# Patient Record
Sex: FEMALE | Race: WHITE | NOT HISPANIC OR LATINO | URBAN - METROPOLITAN AREA
[De-identification: names, ages, dates, MRNs, and addresses within clinical notes are randomized per-mention and may not be internally consistent; named-entity substitution may affect disease eponyms.]

---

## 2023-06-27 LAB
POC CREATININE: 0.6 MG/DL (ref 0.6–1.3)
POCT GFR - DATA CONVERSION: >60

## 2024-07-31 DIAGNOSIS — I77.810 AORTIC ECTASIA, THORACIC (CMS-HCC): ICD-10-CM

## 2024-08-01 ENCOUNTER — OFFICE VISIT (OUTPATIENT)
Dept: CARDIOLOGY | Facility: HOSPITAL | Age: 69
End: 2024-08-01
Payer: MEDICARE

## 2024-08-01 ENCOUNTER — HOSPITAL ENCOUNTER (OUTPATIENT)
Dept: VASCULAR MEDICINE | Facility: HOSPITAL | Age: 69
Discharge: HOME | End: 2024-08-01
Payer: MEDICARE

## 2024-08-01 ENCOUNTER — HOSPITAL ENCOUNTER (OUTPATIENT)
Dept: RADIOLOGY | Facility: HOSPITAL | Age: 69
Discharge: HOME | End: 2024-08-01
Payer: MEDICARE

## 2024-08-01 VITALS
DIASTOLIC BLOOD PRESSURE: 66 MMHG | BODY MASS INDEX: 25.25 KG/M2 | OXYGEN SATURATION: 95 % | WEIGHT: 176.4 LBS | SYSTOLIC BLOOD PRESSURE: 131 MMHG | HEIGHT: 70 IN | HEART RATE: 66 BPM

## 2024-08-01 DIAGNOSIS — I77.810 AORTIC ECTASIA, THORACIC (CMS-HCC): ICD-10-CM

## 2024-08-01 DIAGNOSIS — I77.3 RENAL ARTERY STENOSIS DUE TO FIBROMUSCULAR DYSPLASIA (CMS-HCC): ICD-10-CM

## 2024-08-01 DIAGNOSIS — E78.2 MIXED HYPERLIPIDEMIA: ICD-10-CM

## 2024-08-01 DIAGNOSIS — I77.3 FIBROMUSCULAR DYSPLASIA (CMS-HCC): ICD-10-CM

## 2024-08-01 DIAGNOSIS — I70.1 RENAL ARTERY STENOSIS DUE TO FIBROMUSCULAR DYSPLASIA (CMS-HCC): ICD-10-CM

## 2024-08-01 DIAGNOSIS — I65.22 ATHEROSCLEROSIS OF LEFT CAROTID ARTERY: Primary | ICD-10-CM

## 2024-08-01 DIAGNOSIS — I77.73 RENAL ARTERY DISSECTION (MULTI): ICD-10-CM

## 2024-08-01 PROBLEM — N64.4 BREAST PAIN: Status: ACTIVE | Noted: 2020-10-28

## 2024-08-01 PROBLEM — Z85.3 HISTORY OF MALIGNANT NEOPLASM OF BREAST: Status: ACTIVE | Noted: 2024-08-01

## 2024-08-01 PROBLEM — E78.5 HYPERLIPIDEMIA: Status: ACTIVE | Noted: 2024-08-01

## 2024-08-01 PROBLEM — G47.10 HYPERSOMNIA: Status: ACTIVE | Noted: 2024-04-23

## 2024-08-01 PROBLEM — I65.29 CAROTID ATHEROSCLEROSIS: Status: ACTIVE | Noted: 2024-08-01

## 2024-08-01 PROBLEM — G47.33 OBSTRUCTIVE SLEEP APNEA SYNDROME: Status: ACTIVE | Noted: 2024-08-01

## 2024-08-01 PROBLEM — Z98.890 HISTORY OF OPERATIVE PROCEDURE ON HIP: Status: ACTIVE | Noted: 2024-08-01

## 2024-08-01 PROBLEM — N20.0 KIDNEY STONE: Status: ACTIVE | Noted: 2024-08-01

## 2024-08-01 PROBLEM — C80.1 CANCER (MULTI): Status: ACTIVE | Noted: 2024-08-01

## 2024-08-01 PROBLEM — R55 VASOVAGAL SYNCOPE: Status: ACTIVE | Noted: 2024-08-01

## 2024-08-01 PROBLEM — H26.9 BILATERAL CATARACTS: Status: ACTIVE | Noted: 2024-08-01

## 2024-08-01 PROBLEM — Z85.89 H/O SQUAMOUS CELL CARCINOMA: Status: ACTIVE | Noted: 2021-11-02

## 2024-08-01 PROCEDURE — 3008F BODY MASS INDEX DOCD: CPT | Performed by: INTERNAL MEDICINE

## 2024-08-01 PROCEDURE — 1036F TOBACCO NON-USER: CPT | Performed by: INTERNAL MEDICINE

## 2024-08-01 PROCEDURE — 93975 VASCULAR STUDY: CPT | Performed by: SURGERY

## 2024-08-01 PROCEDURE — 1126F AMNT PAIN NOTED NONE PRSNT: CPT | Performed by: INTERNAL MEDICINE

## 2024-08-01 PROCEDURE — 3078F DIAST BP <80 MM HG: CPT | Performed by: INTERNAL MEDICINE

## 2024-08-01 PROCEDURE — 93975 VASCULAR STUDY: CPT

## 2024-08-01 PROCEDURE — 3075F SYST BP GE 130 - 139MM HG: CPT | Performed by: INTERNAL MEDICINE

## 2024-08-01 PROCEDURE — 71555 MRI ANGIO CHEST W OR W/O DYE: CPT

## 2024-08-01 PROCEDURE — 99214 OFFICE O/P EST MOD 30 MIN: CPT | Performed by: INTERNAL MEDICINE

## 2024-08-01 PROCEDURE — 1159F MED LIST DOCD IN RCRD: CPT | Performed by: INTERNAL MEDICINE

## 2024-08-01 PROCEDURE — 99214 OFFICE O/P EST MOD 30 MIN: CPT | Mod: 25 | Performed by: INTERNAL MEDICINE

## 2024-08-01 PROCEDURE — 71555 MRI ANGIO CHEST W OR W/O DYE: CPT | Performed by: STUDENT IN AN ORGANIZED HEALTH CARE EDUCATION/TRAINING PROGRAM

## 2024-08-01 RX ORDER — SERTRALINE HYDROCHLORIDE 100 MG/1
1 TABLET, FILM COATED ORAL DAILY
COMMUNITY

## 2024-08-01 RX ORDER — MONTELUKAST SODIUM 10 MG/1
1 TABLET ORAL DAILY
COMMUNITY
Start: 2024-05-22

## 2024-08-01 RX ORDER — TRETINOIN 0.5 MG/G
CREAM TOPICAL
COMMUNITY
Start: 2023-11-08

## 2024-08-01 RX ORDER — CHOLECALCIFEROL (VITAMIN D3) 50 MCG
TABLET ORAL
COMMUNITY

## 2024-08-01 RX ORDER — ROSUVASTATIN CALCIUM 20 MG/1
1 TABLET, COATED ORAL DAILY
COMMUNITY
Start: 2023-06-28

## 2024-08-01 RX ORDER — PREGABALIN 300 MG/1
1 CAPSULE ORAL NIGHTLY
COMMUNITY

## 2024-08-01 RX ORDER — NAPROXEN SODIUM 220 MG/1
81 TABLET, FILM COATED ORAL
COMMUNITY

## 2024-08-01 RX ORDER — LOSARTAN POTASSIUM 25 MG/1
1 TABLET ORAL DAILY
COMMUNITY
Start: 2021-04-21

## 2024-08-01 RX ORDER — MULTIVITAMIN
1 TABLET ORAL DAILY
COMMUNITY

## 2024-08-01 RX ORDER — CLONAZEPAM 0.5 MG/1
0.5 TABLET, ORALLY DISINTEGRATING ORAL
COMMUNITY
Start: 2023-09-14

## 2024-08-01 ASSESSMENT — PATIENT HEALTH QUESTIONNAIRE - PHQ9
2. FEELING DOWN, DEPRESSED OR HOPELESS: NOT AT ALL
1. LITTLE INTEREST OR PLEASURE IN DOING THINGS: NOT AT ALL
SUM OF ALL RESPONSES TO PHQ9 QUESTIONS 1 AND 2: 0

## 2024-08-01 ASSESSMENT — COLUMBIA-SUICIDE SEVERITY RATING SCALE - C-SSRS
6. HAVE YOU EVER DONE ANYTHING, STARTED TO DO ANYTHING, OR PREPARED TO DO ANYTHING TO END YOUR LIFE?: NO
1. IN THE PAST MONTH, HAVE YOU WISHED YOU WERE DEAD OR WISHED YOU COULD GO TO SLEEP AND NOT WAKE UP?: NO
2. HAVE YOU ACTUALLY HAD ANY THOUGHTS OF KILLING YOURSELF?: NO

## 2024-08-01 ASSESSMENT — PAIN SCALES - GENERAL: PAINLEVEL: 0-NO PAIN

## 2024-08-01 NOTE — PROGRESS NOTES
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 Her lesions are not entirely classical for FMD. She also has significant arterial tortuosity. Her mother had an abdominal aortic aneurysm that was monitored and also had a myocardial infarction of uncertain mechanism as her coronary angiogram was \"normal\". Prior multigene aortopathy panel negative. She has no family hx of aortic dissection.    Since I saw Ms. Monroe last, she has not had major events.  NO ER visits or hosptial stays. No chest pain, dyspnea. No major flank/back pain. No bleeding. BP well controlled.     She is a breast cancer survivor and has bilateral implants. Had abdominal imaging given BRACA1 mutation.    She has mild atherosclerotic disease on imaging (dee. Left ICA); on statin Rx and doing fine with this.    She was dx with and is now receiving Rx for obstructive sleep apnea.    Patient Active Problem List   Diagnosis    Allergic rhinitis    Aortic ectasia, thoracic (CMS-HCC)    Renal infarction (Multi)    Benign neoplasm of rectum and anal canal    Bilateral cataracts    BRCA gene positive    Breast neoplasm    Breast cancer (Multi)    Breast pain    Cancer (Multi)    Carotid atherosclerosis    Closed fracture of right ankle with routine healing    H/O squamous cell carcinoma    Hereditary and idiopathic peripheral neuropathy    History of malignant neoplasm of breast    History of operative procedure on hip    Hyperlipidemia    Hypersomnia    Hypertension, benign    Kidney stone    Obstructive sleep apnea syndrome    Pure hypercholesterolemia    Renal artery dissection (Multi)    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      cholecalciferol (Vitamin D-3) 50 MCG (2000 UT) tablet Take by mouth.      clonazePAM (KlonoPIN) 0.5 mg disintegrating tablet Take 1 tablet (0.5 mg) by mouth.      losartan (Cozaar) 25 mg tablet Take 1 tablet (25 mg) by mouth once daily.      montelukast (Singulair) 10 mg tablet Take 1 tablet (10 mg) by mouth once daily.      multivitamin (Daily Multi-Vitamin) tablet Take 1 tablet by mouth once daily.      pregabalin (Lyrica) 300 mg capsule Take 1 capsule (300 mg) by mouth once daily at bedtime.      Retin-A 0.05 % cream APPLY TOPICALLY TO THE AFFECTED AREA EVERY NIGHT      rosuvastatin (Crestor) 20 mg tablet Take 1 tablet (20 mg) by mouth once daily.      sertraline (Zoloft) 100 mg tablet Take 1 tablet (100 mg) by mouth once daily.       No current facility-administered medications for this visit.     No Known Allergies     Physical Exam  /66 (BP Location: Right arm, Patient Position: Sitting, BP Cuff Size: Adult)   Pulse 66   Ht 1.778 m (5' 10\")   Wt 80 kg (176 lb 6.4 oz)   SpO2 95%   BMI 25.31 kg/m²   She was in no distress, vital signs as above.  HEENT benign, no facial swelling, No Diane's, but slightly wideset eyes  JVP normal, no cervical bruits  +S1, S2, RRR; no murmur of AS or AI  Clear lungs bilaterally  Abd benign  No edema, brisk pedal pulses  Speech normal. She is fully alert and oriented  Bright, appropriate affect      Results/Data  I reviewed today's chest MRA  I think ascending aortic sizse stable ! 4.1 cms    Renal duplex normal velocities in both renal arteries  Left renal cyst    Report of 4.2024 MRI abdomen reviewed  Abdominal aorta normal in caliber    6.2023 CTA c/a/p  mpression   Overall stable appearance of the vasculature when compared to the  previous study of 06/22/2021. Findings include beaded appearance of  the right renal artery, " high-grade stenosis at the origin of the  right renal artery, stable 4 mm aneurysm involving the left renal  artery, and stable ectasia of the ascending aorta up to approximately  4.0 cm.  Mild prominence of the walls of the urinary bladder most likely  relates to incomplete distention. Correlate with any signs/symptoms  of cystitis.       Prior head/neck imaging 2021  Tortuousity, left ICA plaque    Assessment/Plan:  ns     Wonderful 69-year-old woman with an FMD-like process with prior renal artery dissections and history of angioplasty and infarction. She has ascending aortic ectasia/aneurysm and right common iliac ectasia. She has family hx of AAA; no family hx of aortic dissection.  She has an FMD-like process; no known genetic mutation/syndrome (Loeys Jesse/vEDS) identified, but there are likely still genetic factors at work that cannot be identified.     I reviewed her imaging -- I think her aorta I stable at ~ 4.1 cms; await radiology review.   We again discussed current indication for repair ~ 5 cms; in the past, she has no family hx and no MFS/Loeys Jesse or known aortopathy variants on her genetic panel. Her left renal chronic dissection/dilatation is stable and findings in right renal artery.     For now, need to continue medical Rx with aspirin, BP control with losartan with BP < 130/80 mm Hg at all times. Continue statin Rx given atheroma, hyperlipidemia.    She shoud have a carotid duplex at some point to follow-up left ICA atheroma moreso arteriopathy. This was last done in 2021.  She in on aspirin and statin Rx.    I think we can plan comprehensive re imaging for thoracic aortic aneurysm, renal dissection/ectasia, illiac ectasia with CTA head to pelvis in 2 years' time.    Isamar Kirkland MD

## 2024-08-01 NOTE — PATIENT INSTRUCTIONS
Recommend renal ultrasound in 1 year back home.    See you back in 2 years with CTA head to pelvis.    Isamar Kirkland MD  Co-Director, Vascular Center  [facility], [facility]   [facility] Master Clinician in Fibromuscular Dysplasia and Vascular Care  Professor of Medicine  [facility]